# Patient Record
Sex: FEMALE | Race: WHITE | NOT HISPANIC OR LATINO | Employment: FULL TIME | URBAN - METROPOLITAN AREA
[De-identification: names, ages, dates, MRNs, and addresses within clinical notes are randomized per-mention and may not be internally consistent; named-entity substitution may affect disease eponyms.]

---

## 2023-03-15 ENCOUNTER — TELEPHONE (OUTPATIENT)
Dept: PRIMARY CARE | Facility: CLINIC | Age: 58
End: 2023-03-15
Payer: COMMERCIAL

## 2023-08-09 PROBLEM — M79.661 PAIN AND SWELLING OF RIGHT LOWER LEG: Status: ACTIVE | Noted: 2023-08-09

## 2023-08-09 PROBLEM — Z77.21 EXPOSURE TO POTENTIALLY HAZARDOUS BODY FLUIDS: Status: ACTIVE | Noted: 2023-08-09

## 2023-08-09 PROBLEM — M79.662 PAIN AND SWELLING OF LEFT LOWER LEG: Status: ACTIVE | Noted: 2023-08-09

## 2023-08-09 PROBLEM — H01.003 BLEPHARITIS OF EYELID OF RIGHT EYE: Status: ACTIVE | Noted: 2023-08-09

## 2023-08-09 PROBLEM — M25.579 SINUS TARSI SYNDROME: Status: ACTIVE | Noted: 2023-08-09

## 2023-08-09 PROBLEM — E86.0 DEHYDRATION: Status: ACTIVE | Noted: 2023-08-09

## 2023-08-09 PROBLEM — R60.0 EDEMA OF RIGHT FOOT: Status: ACTIVE | Noted: 2023-08-09

## 2023-08-09 PROBLEM — J01.40 ACUTE PANSINUSITIS: Status: ACTIVE | Noted: 2023-08-09

## 2023-08-09 PROBLEM — J06.9 UPPER RESPIRATORY INFECTION, ACUTE: Status: ACTIVE | Noted: 2023-08-09

## 2023-08-09 PROBLEM — N95.0 ABNORMAL VAGINAL BLEEDING IN POSTMENOPAUSAL PATIENT: Status: ACTIVE | Noted: 2023-08-09

## 2023-08-09 PROBLEM — M21.40 PES PLANUS: Status: ACTIVE | Noted: 2023-08-09

## 2023-08-09 PROBLEM — M79.671 RIGHT FOOT PAIN: Status: ACTIVE | Noted: 2023-08-09

## 2023-08-09 PROBLEM — R03.0 BLOOD PRESSURE ELEVATED WITHOUT HISTORY OF HTN: Status: ACTIVE | Noted: 2023-08-09

## 2023-08-09 PROBLEM — N92.6 IRREGULAR MENSTRUAL CYCLE: Status: ACTIVE | Noted: 2023-08-09

## 2023-08-09 PROBLEM — K52.9 ACUTE GASTROENTERITIS: Status: ACTIVE | Noted: 2023-08-09

## 2023-08-09 PROBLEM — E04.2 NONTOXIC MULTINODULAR GOITER: Status: ACTIVE | Noted: 2023-08-09

## 2023-08-09 PROBLEM — M25.562 LEFT KNEE PAIN: Status: ACTIVE | Noted: 2023-08-09

## 2023-08-09 PROBLEM — S99.929A FOOT INJURY: Status: ACTIVE | Noted: 2023-08-09

## 2023-08-09 PROBLEM — M79.89 PAIN AND SWELLING OF RIGHT LOWER LEG: Status: ACTIVE | Noted: 2023-08-09

## 2023-08-09 PROBLEM — M79.89 PAIN AND SWELLING OF LEFT LOWER LEG: Status: ACTIVE | Noted: 2023-08-09

## 2023-08-09 PROBLEM — M76.822 POSTERIOR TIBIAL TENDONITIS, LEFT: Status: ACTIVE | Noted: 2023-08-09

## 2023-08-09 PROBLEM — H01.006 BLEPHARITIS OF EYELID OF LEFT EYE: Status: ACTIVE | Noted: 2023-08-09

## 2023-08-09 PROBLEM — H90.3 BILATERAL HIGH FREQUENCY SENSORINEURAL HEARING LOSS: Status: ACTIVE | Noted: 2023-08-09

## 2023-08-09 PROBLEM — R05.9 COUGH: Status: ACTIVE | Noted: 2023-08-09

## 2023-08-09 PROBLEM — M79.673 PAIN OF FOOT: Status: ACTIVE | Noted: 2023-08-09

## 2023-08-09 PROBLEM — S61.412A LACERATION OF LEFT HAND WITHOUT FOREIGN BODY: Status: ACTIVE | Noted: 2023-08-09

## 2023-08-09 PROBLEM — M17.11 ARTHRITIS OF RIGHT KNEE: Status: ACTIVE | Noted: 2023-08-09

## 2023-08-09 PROBLEM — M19.049 ARTHROPATHY OF HAND: Status: ACTIVE | Noted: 2023-08-09

## 2023-08-09 PROBLEM — R74.8 ELEVATED LIVER ENZYMES: Status: ACTIVE | Noted: 2023-08-09

## 2023-08-09 PROBLEM — E66.9 OBESITY: Status: ACTIVE | Noted: 2023-08-09

## 2023-08-09 PROBLEM — M12.9 ARTHROPATHY OF MULTIPLE SITES: Status: ACTIVE | Noted: 2023-08-09

## 2023-08-09 PROBLEM — K76.0 FATTY INFILTRATION OF LIVER: Status: ACTIVE | Noted: 2023-08-09

## 2023-08-09 PROBLEM — E04.9 THYROID ENLARGEMENT: Status: ACTIVE | Noted: 2023-08-09

## 2023-08-09 PROBLEM — R07.9 CHEST PAIN: Status: ACTIVE | Noted: 2023-08-09

## 2023-08-09 PROBLEM — J01.90 ACUTE SINUSITIS: Status: ACTIVE | Noted: 2023-08-09

## 2023-08-09 PROBLEM — E78.5 HYPERLIPIDEMIA: Status: ACTIVE | Noted: 2023-08-09

## 2023-08-09 PROBLEM — J20.9 ACUTE BRONCHITIS: Status: ACTIVE | Noted: 2023-08-09

## 2023-08-09 PROBLEM — H53.9 CHANGE IN VISION: Status: ACTIVE | Noted: 2023-08-09

## 2023-08-09 PROBLEM — M77.32 CALCANEAL SPUR, LEFT: Status: ACTIVE | Noted: 2023-08-09

## 2023-08-09 PROBLEM — M72.2 PLANTAR FASCIITIS: Status: ACTIVE | Noted: 2023-08-09

## 2023-08-09 PROBLEM — M54.2 NECK PAIN: Status: ACTIVE | Noted: 2023-08-09

## 2023-08-09 PROBLEM — J02.9 SORE THROAT: Status: ACTIVE | Noted: 2023-08-09

## 2023-08-09 PROBLEM — H04.123 DRY EYES, BILATERAL: Status: ACTIVE | Noted: 2023-08-09

## 2023-08-09 PROBLEM — M76.829 TIBIALIS POSTERIOR DYSFUNCTION: Status: ACTIVE | Noted: 2023-08-09

## 2023-08-09 PROBLEM — R06.02 SHORTNESS OF BREATH: Status: ACTIVE | Noted: 2023-08-09

## 2023-08-09 PROBLEM — M25.561 KNEE PAIN, RIGHT: Status: ACTIVE | Noted: 2023-08-09

## 2023-08-09 PROBLEM — H69.90 EUSTACHIAN TUBE DYSFUNCTION: Status: ACTIVE | Noted: 2023-08-09

## 2023-08-09 PROBLEM — I87.2 CHRONIC VENOUS INSUFFICIENCY: Status: ACTIVE | Noted: 2023-08-09
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
## 2023-08-09 PROBLEM — R53.83 FATIGUE: Status: ACTIVE | Noted: 2023-08-09

## 2023-08-09 RX ORDER — VALACYCLOVIR HYDROCHLORIDE 1 G/1
1000 TABLET, FILM COATED ORAL EVERY 8 HOURS
Qty: 21 TABLET | Refills: 0 | COMMUNITY
Start: 2022-11-17 | End: 2022-11-24

## 2023-08-15 ENCOUNTER — APPOINTMENT (OUTPATIENT)
Dept: PRIMARY CARE | Facility: CLINIC | Age: 58
End: 2023-08-15
Payer: COMMERCIAL

## 2023-09-11 DIAGNOSIS — Z12.31 BREAST CANCER SCREENING BY MAMMOGRAM: ICD-10-CM

## 2023-11-22 ENCOUNTER — OFFICE VISIT (OUTPATIENT)
Dept: PRIMARY CARE | Facility: CLINIC | Age: 58
End: 2023-11-22
Payer: COMMERCIAL

## 2023-11-22 ENCOUNTER — LAB (OUTPATIENT)
Dept: LAB | Facility: LAB | Age: 58
End: 2023-11-22
Payer: COMMERCIAL

## 2023-11-22 VITALS
RESPIRATION RATE: 14 BRPM | HEART RATE: 82 BPM | OXYGEN SATURATION: 98 % | BODY MASS INDEX: 35.09 KG/M2 | TEMPERATURE: 97.6 F | SYSTOLIC BLOOD PRESSURE: 137 MMHG | HEIGHT: 65 IN | DIASTOLIC BLOOD PRESSURE: 85 MMHG | WEIGHT: 210.6 LBS

## 2023-11-22 DIAGNOSIS — Z00.00 HEALTHCARE MAINTENANCE: ICD-10-CM

## 2023-11-22 DIAGNOSIS — E78.2 MIXED HYPERLIPIDEMIA: Primary | ICD-10-CM

## 2023-11-22 DIAGNOSIS — Z12.11 COLON CANCER SCREENING: ICD-10-CM

## 2023-11-22 DIAGNOSIS — E66.01 CLASS 2 SEVERE OBESITY DUE TO EXCESS CALORIES WITH SERIOUS COMORBIDITY AND BODY MASS INDEX (BMI) OF 35.0 TO 35.9 IN ADULT (MULTI): ICD-10-CM

## 2023-11-22 DIAGNOSIS — K76.0 FATTY INFILTRATION OF LIVER: ICD-10-CM

## 2023-11-22 LAB
25(OH)D3 SERPL-MCNC: 23 NG/ML (ref 30–100)
ALBUMIN SERPL BCP-MCNC: 4.1 G/DL (ref 3.4–5)
ALP SERPL-CCNC: 76 U/L (ref 33–110)
ALT SERPL W P-5'-P-CCNC: 51 U/L (ref 7–45)
ANION GAP SERPL CALC-SCNC: 12 MMOL/L (ref 10–20)
AST SERPL W P-5'-P-CCNC: 43 U/L (ref 9–39)
BILIRUB SERPL-MCNC: 0.6 MG/DL (ref 0–1.2)
BUN SERPL-MCNC: 11 MG/DL (ref 6–23)
CALCIUM SERPL-MCNC: 9.2 MG/DL (ref 8.6–10.3)
CHLORIDE SERPL-SCNC: 107 MMOL/L (ref 98–107)
CHOLEST SERPL-MCNC: 189 MG/DL (ref 0–199)
CHOLESTEROL/HDL RATIO: 3.9
CO2 SERPL-SCNC: 25 MMOL/L (ref 21–32)
CREAT SERPL-MCNC: 0.71 MG/DL (ref 0.5–1.05)
ERYTHROCYTE [DISTWIDTH] IN BLOOD BY AUTOMATED COUNT: 12.2 % (ref 11.5–14.5)
GFR SERPL CREATININE-BSD FRML MDRD: >90 ML/MIN/1.73M*2
GLUCOSE SERPL-MCNC: 89 MG/DL (ref 74–99)
HCT VFR BLD AUTO: 39.2 % (ref 36–46)
HDLC SERPL-MCNC: 49 MG/DL
HGB BLD-MCNC: 13.4 G/DL (ref 12–16)
LDLC SERPL CALC-MCNC: 113 MG/DL
MCH RBC QN AUTO: 32.3 PG (ref 26–34)
MCHC RBC AUTO-ENTMCNC: 34.2 G/DL (ref 32–36)
MCV RBC AUTO: 95 FL (ref 80–100)
NON HDL CHOLESTEROL: 140 MG/DL (ref 0–149)
NRBC BLD-RTO: 0 /100 WBCS (ref 0–0)
PLATELET # BLD AUTO: 220 X10*3/UL (ref 150–450)
POTASSIUM SERPL-SCNC: 4.1 MMOL/L (ref 3.5–5.3)
PROT SERPL-MCNC: 6.4 G/DL (ref 6.4–8.2)
RBC # BLD AUTO: 4.15 X10*6/UL (ref 4–5.2)
SODIUM SERPL-SCNC: 140 MMOL/L (ref 136–145)
TRIGL SERPL-MCNC: 135 MG/DL (ref 0–149)
TSH SERPL-ACNC: 0.77 MIU/L (ref 0.44–3.98)
VLDL: 27 MG/DL (ref 0–40)
WBC # BLD AUTO: 5.2 X10*3/UL (ref 4.4–11.3)

## 2023-11-22 PROCEDURE — 3008F BODY MASS INDEX DOCD: CPT | Performed by: INTERNAL MEDICINE

## 2023-11-22 PROCEDURE — 82306 VITAMIN D 25 HYDROXY: CPT

## 2023-11-22 PROCEDURE — 93000 ELECTROCARDIOGRAM COMPLETE: CPT | Performed by: INTERNAL MEDICINE

## 2023-11-22 PROCEDURE — 82274 ASSAY TEST FOR BLOOD FECAL: CPT | Performed by: INTERNAL MEDICINE

## 2023-11-22 PROCEDURE — 85027 COMPLETE CBC AUTOMATED: CPT

## 2023-11-22 PROCEDURE — 36415 COLL VENOUS BLD VENIPUNCTURE: CPT

## 2023-11-22 PROCEDURE — 84443 ASSAY THYROID STIM HORMONE: CPT

## 2023-11-22 PROCEDURE — 80061 LIPID PANEL: CPT

## 2023-11-22 PROCEDURE — 80053 COMPREHEN METABOLIC PANEL: CPT

## 2023-11-22 PROCEDURE — 1036F TOBACCO NON-USER: CPT | Performed by: INTERNAL MEDICINE

## 2023-11-22 PROCEDURE — 99396 PREV VISIT EST AGE 40-64: CPT | Performed by: INTERNAL MEDICINE

## 2023-11-22 RX ORDER — IBUPROFEN 200 MG
200 TABLET ORAL EVERY 6 HOURS
COMMUNITY

## 2023-11-22 ASSESSMENT — ENCOUNTER SYMPTOMS
RESPIRATORY NEGATIVE: 1
SPEECH DIFFICULTY: 0
ARTHRALGIAS: 0
NEUROLOGICAL NEGATIVE: 1
CONFUSION: 0
LIGHT-HEADEDNESS: 0
COLOR CHANGE: 0
UNEXPECTED WEIGHT CHANGE: 0
SINUS PRESSURE: 0
POLYPHAGIA: 0
APPETITE CHANGE: 0
NAUSEA: 0
TREMORS: 0
MYALGIAS: 0
HEADACHES: 0
WEAKNESS: 0
SORE THROAT: 0
BACK PAIN: 0
DIARRHEA: 0
DIFFICULTY URINATING: 0
BLOOD IN STOOL: 0
NECK STIFFNESS: 0
NUMBNESS: 0
WHEEZING: 0
BRUISES/BLEEDS EASILY: 0
EYE PAIN: 0
TROUBLE SWALLOWING: 0
DIZZINESS: 0
DEPRESSION: 0
POLYDIPSIA: 0
ACTIVITY CHANGE: 0
AGITATION: 0
ENDOCRINE NEGATIVE: 1
ABDOMINAL PAIN: 0
HEMATOLOGIC/LYMPHATIC NEGATIVE: 1
SHORTNESS OF BREATH: 0
ALLERGIC/IMMUNOLOGIC NEGATIVE: 1
CARDIOVASCULAR NEGATIVE: 1
STRIDOR: 0
COUGH: 0
DYSURIA: 0
NERVOUS/ANXIOUS: 0
HALLUCINATIONS: 0
EYES NEGATIVE: 1
EYE DISCHARGE: 0
ADENOPATHY: 0
SLEEP DISTURBANCE: 0
WOUND: 0
EYE REDNESS: 0
SEIZURES: 0
CONSTIPATION: 0
PSYCHIATRIC NEGATIVE: 1
VOMITING: 0
FLANK PAIN: 0
LOSS OF SENSATION IN FEET: 0
FREQUENCY: 0
CONSTITUTIONAL NEGATIVE: 1
MUSCULOSKELETAL NEGATIVE: 1
FACIAL ASYMMETRY: 0
CHEST TIGHTNESS: 0
NECK PAIN: 0
JOINT SWELLING: 0
SINUS PAIN: 0
GASTROINTESTINAL NEGATIVE: 1
VOICE CHANGE: 0
OCCASIONAL FEELINGS OF UNSTEADINESS: 0
PALPITATIONS: 0

## 2023-11-22 ASSESSMENT — PATIENT HEALTH QUESTIONNAIRE - PHQ9
1. LITTLE INTEREST OR PLEASURE IN DOING THINGS: NOT AT ALL
SUM OF ALL RESPONSES TO PHQ9 QUESTIONS 1 AND 2: 0
2. FEELING DOWN, DEPRESSED OR HOPELESS: NOT AT ALL

## 2023-11-22 NOTE — PROGRESS NOTES
Subjective   Patient ID: Maureen Landis is a 58 y.o. female who presents for Annual Exam (Patient is here for a physical.).  HPI    Patient has been feeling pretty good and has been complaint with prescribed medications.  Under a lot of stress due to her 77 yo mother declining health status.  Her father who is 79 yo has been still working.    She works at [employer].    We reviewed and discussed details of  blood work: CBC, CMP, TSH, Lipid panel, Hb A1c, Vit D done in 2022.  Results within acceptable rang except mildly elevated liver enzymes and LDL cholesterol.    Overdue for mammogram and colon ca screening, strongly advised to arrange both.  PAP: per GYN    Review of Systems   Constitutional: Negative.  Negative for activity change, appetite change and unexpected weight change.   HENT: Negative.  Negative for congestion, ear discharge, ear pain, hearing loss, mouth sores, nosebleeds, sinus pressure, sinus pain, sore throat, trouble swallowing and voice change.    Eyes: Negative.  Negative for pain, discharge, redness and visual disturbance.   Respiratory: Negative.  Negative for cough, chest tightness, shortness of breath, wheezing and stridor.    Cardiovascular: Negative.  Negative for chest pain, palpitations and leg swelling.   Gastrointestinal: Negative.  Negative for abdominal pain, blood in stool, constipation, diarrhea, nausea and vomiting.   Endocrine: Negative.  Negative for polydipsia, polyphagia and polyuria.   Genitourinary: Negative.  Negative for difficulty urinating, dysuria, flank pain, frequency and urgency.   Musculoskeletal: Negative.  Negative for arthralgias, back pain, gait problem, joint swelling, myalgias, neck pain and neck stiffness.   Skin: Negative.  Negative for color change, rash and wound.   Allergic/Immunologic: Negative.  Negative for environmental allergies, food allergies and immunocompromised state.   Neurological: Negative.  Negative for  "dizziness, tremors, seizures, syncope, facial asymmetry, speech difficulty, weakness, light-headedness, numbness and headaches.   Hematological: Negative.  Negative for adenopathy. Does not bruise/bleed easily.   Psychiatric/Behavioral: Negative.  Negative for agitation, behavioral problems, confusion, hallucinations, sleep disturbance and suicidal ideas. The patient is not nervous/anxious.    All other systems reviewed and are negative.      Objective     Review of systems was performed and all systems were negative except what in HPI    /85   Pulse 82   Temp 36.4 °C (97.6 °F)   Resp 14   Ht 1.651 m (5' 5\")   Wt 95.5 kg (210 lb 9.6 oz)   SpO2 98%   BMI 35.05 kg/m²    Physical Exam  Vitals and nursing note reviewed.   Constitutional:       General: She is not in acute distress.     Appearance: Normal appearance.   HENT:      Head: Normocephalic and atraumatic.      Right Ear: Tympanic membrane, ear canal and external ear normal.      Left Ear: Tympanic membrane, ear canal and external ear normal.      Nose: Nose normal. No congestion or rhinorrhea.      Mouth/Throat:      Mouth: Mucous membranes are moist.      Pharynx: Oropharynx is clear.   Eyes:      General:         Right eye: No discharge.         Left eye: No discharge.      Extraocular Movements: Extraocular movements intact.      Conjunctiva/sclera: Conjunctivae normal.      Pupils: Pupils are equal, round, and reactive to light.   Cardiovascular:      Rate and Rhythm: Normal rate and regular rhythm.      Pulses: Normal pulses.      Heart sounds: Normal heart sounds. No murmur heard.     No friction rub. No gallop.   Pulmonary:      Effort: Pulmonary effort is normal. No respiratory distress.      Breath sounds: Normal breath sounds. No stridor. No wheezing, rhonchi or rales.   Chest:      Chest wall: No tenderness.   Abdominal:      General: Bowel sounds are normal.      Palpations: Abdomen is soft. There is no mass.      Tenderness: There is " no abdominal tenderness. There is no guarding or rebound.   Musculoskeletal:         General: No swelling or deformity. Normal range of motion.      Cervical back: Normal range of motion and neck supple. No rigidity or tenderness.      Right lower leg: No edema.      Left lower leg: No edema.   Lymphadenopathy:      Cervical: No cervical adenopathy.   Skin:     General: Skin is warm and dry.      Coloration: Skin is not jaundiced.      Findings: No bruising or erythema.   Neurological:      General: No focal deficit present.      Mental Status: She is alert and oriented to person, place, and time. Mental status is at baseline.      Cranial Nerves: No cranial nerve deficit.      Motor: No weakness.      Coordination: Coordination normal.      Gait: Gait normal.   Psychiatric:         Mood and Affect: Mood normal.         Behavior: Behavior normal.         Thought Content: Thought content normal.         Judgment: Judgment normal.         Assessment/Plan   Problem List Items Addressed This Visit             ICD-10-CM       Cardiac and Vasculature    Hyperlipidemia - Primary E78.5     Low cholesterol and low carbohydrate diet is advised.          Relevant Orders    ECG 12 lead (Clinic Performed) (Completed)       Endocrine/Metabolic    Obesity E66.9     Weight loss is advised. Target BMI: 25. Please follow low carbohydrate diet and exercise at least 150 minutes weekly.  Nutritional consultation is available, please let me know if interested.             Gastrointestinal and Abdominal    Fatty infiltration of liver K76.0     Weight loss is advised. Target BMI: 25. Please follow low carbohydrate diet and exercise at least 150 minutes weekly.  Nutritional consultation is available, please let me know if interested.             Health Encounters    Healthcare maintenance Z00.00    Relevant Orders    CBC    Comprehensive Metabolic Panel    Lipid Panel    TSH with reflex to Free T4 if abnormal    Vitamin D 25-Hydroxy,Total  (for eval of Vitamin D levels)     Other Visit Diagnoses         Codes    Colon cancer screening     Z12.11    Relevant Orders    Fecal Occult Blood Immunoassy        It was a pleasure to see you today.  I would like to remind you about importance of a healthy lifestyle in order to improve your well-being and live longer.  Try to engage in physical activities for at least 150 minutes per week.  Eat about 10 servings of fruits and vegetables daily. My advice is 2 servings of fruits and 8 servings of vegetables.  For vegetables choose at least half of them green and at least half of them fresh.  Please avoid sugar, salt, fried food and saturated fat.    F/up in 1 year or sooner if needed.

## 2023-11-25 LAB — HEMOCCULT STL QL IA: NEGATIVE

## 2024-04-03 ENCOUNTER — APPOINTMENT (OUTPATIENT)
Dept: OTOLARYNGOLOGY | Facility: CLINIC | Age: 59
End: 2024-04-03
Payer: COMMERCIAL

## 2024-04-03 ENCOUNTER — APPOINTMENT (OUTPATIENT)
Dept: AUDIOLOGY | Facility: CLINIC | Age: 59
End: 2024-04-03
Payer: COMMERCIAL

## 2024-04-16 ENCOUNTER — HOSPITAL ENCOUNTER (EMERGENCY)
Facility: HOSPITAL | Age: 59
Discharge: HOME | End: 2024-04-16
Payer: COMMERCIAL

## 2024-04-16 ENCOUNTER — APPOINTMENT (OUTPATIENT)
Dept: RADIOLOGY | Facility: HOSPITAL | Age: 59
End: 2024-04-16
Payer: COMMERCIAL

## 2024-04-16 VITALS
RESPIRATION RATE: 20 BRPM | HEART RATE: 96 BPM | WEIGHT: 210 LBS | HEIGHT: 66 IN | DIASTOLIC BLOOD PRESSURE: 94 MMHG | SYSTOLIC BLOOD PRESSURE: 160 MMHG | TEMPERATURE: 97 F | OXYGEN SATURATION: 99 % | BODY MASS INDEX: 33.75 KG/M2

## 2024-04-16 DIAGNOSIS — S80.01XA CONTUSION OF RIGHT KNEE, INITIAL ENCOUNTER: ICD-10-CM

## 2024-04-16 DIAGNOSIS — S50.01XA CONTUSION OF RIGHT ELBOW, INITIAL ENCOUNTER: Primary | ICD-10-CM

## 2024-04-16 PROCEDURE — 73560 X-RAY EXAM OF KNEE 1 OR 2: CPT | Mod: RIGHT SIDE | Performed by: RADIOLOGY

## 2024-04-16 PROCEDURE — 73080 X-RAY EXAM OF ELBOW: CPT | Mod: RIGHT SIDE | Performed by: RADIOLOGY

## 2024-04-16 PROCEDURE — 99284 EMERGENCY DEPT VISIT MOD MDM: CPT

## 2024-04-16 PROCEDURE — 73560 X-RAY EXAM OF KNEE 1 OR 2: CPT | Mod: RT

## 2024-04-16 PROCEDURE — 2500000004 HC RX 250 GENERAL PHARMACY W/ HCPCS (ALT 636 FOR OP/ED): Performed by: NURSE PRACTITIONER

## 2024-04-16 PROCEDURE — 96372 THER/PROPH/DIAG INJ SC/IM: CPT | Performed by: NURSE PRACTITIONER

## 2024-04-16 PROCEDURE — 73080 X-RAY EXAM OF ELBOW: CPT | Mod: RT

## 2024-04-16 RX ORDER — KETOROLAC TROMETHAMINE 30 MG/ML
30 INJECTION, SOLUTION INTRAMUSCULAR; INTRAVENOUS ONCE
Status: COMPLETED | OUTPATIENT
Start: 2024-04-16 | End: 2024-04-16

## 2024-04-16 RX ADMIN — KETOROLAC TROMETHAMINE 30 MG: 30 INJECTION, SOLUTION INTRAMUSCULAR at 12:55

## 2024-04-16 ASSESSMENT — COLUMBIA-SUICIDE SEVERITY RATING SCALE - C-SSRS
2. HAVE YOU ACTUALLY HAD ANY THOUGHTS OF KILLING YOURSELF?: NO
1. IN THE PAST MONTH, HAVE YOU WISHED YOU WERE DEAD OR WISHED YOU COULD GO TO SLEEP AND NOT WAKE UP?: NO
6. HAVE YOU EVER DONE ANYTHING, STARTED TO DO ANYTHING, OR PREPARED TO DO ANYTHING TO END YOUR LIFE?: NO

## 2024-04-16 ASSESSMENT — PAIN SCALES - GENERAL: PAINLEVEL_OUTOF10: 8

## 2024-04-16 ASSESSMENT — VISUAL ACUITY: OU: 1

## 2024-04-16 ASSESSMENT — PAIN - FUNCTIONAL ASSESSMENT: PAIN_FUNCTIONAL_ASSESSMENT: 0-10

## 2024-04-16 NOTE — Clinical Note
Maureen Landis was seen and treated in our emergency department on 4/16/2024.  She may return to work on 04/18/2024.       If you have any questions or concerns, please don't hesitate to call.      Aelx Monk, LISBET-CNP

## 2024-04-16 NOTE — ED PROVIDER NOTES
HPI   Chief Complaint   Patient presents with    Leg Injury    Arm Injury       Patient is a 59-year-old female with past medical history of hypertension, who presents ED today due to right knee and elbow pain.  Patient states she was on her deck yesterday and more of the boards broke and her right leg went through.  Patient states initially she thought she was just bruised up from the boards but this morning she woke up and was having moderate pain in her right knee and right elbow.  She denies any distal numbness or weakness.  She has full range of motion with both knee and elbow.      History provided by:  Patient   used: No                        [place] Coma Scale Score: 15                     Patient History   Past Medical History:   Diagnosis Date    Elevated blood-pressure reading, without diagnosis of hypertension 2018    Blood pressure elevated without history of HTN    Other conditions influencing health status 10/12/2017    History of cough    Pain in right foot 2016    Right foot pain    Personal history of (healed) traumatic fracture     History of fracture of patella    Personal history of other diseases of the nervous system and sense organs 2018    History of hearing loss    Personal history of other diseases of the respiratory system 2020    History of acute bronchitis    Personal history of other specified conditions 2017    History of heartburn     Past Surgical History:   Procedure Laterality Date    APPENDECTOMY  2017    Appendectomy     SECTION, CLASSIC  2014     Section    CHOLECYSTECTOMY  2014    Cholecystectomy Laparoscopic    CHOLECYSTECTOMY  2017    Cholecystectomy    KNEE SURGERY  2017    Knee Surgery     Family History   Problem Relation Name Age of Onset    Arthritis Mother      Thyroid disease Maternal Grandmother      Glaucoma Maternal Grandfather      Cancer Other       Social History      Tobacco Use    Smoking status: Never     Passive exposure: Never    Smokeless tobacco: Never   Vaping Use    Vaping status: Never Used   Substance Use Topics    Alcohol use: Not on file    Drug use: Not on file       Physical Exam   ED Triage Vitals [04/16/24 1244]   Temperature Heart Rate Respirations BP   36.1 °C (97 °F) 96 20 (!) 160/94      Pulse Ox Temp Source Heart Rate Source Patient Position   99 % Temporal Monitor Sitting      BP Location FiO2 (%)     Right arm --       Physical Exam  Vitals and nursing note reviewed.   Constitutional:       General: She is not in acute distress.     Appearance: Normal appearance. She is not toxic-appearing or diaphoretic.      Interventions: Cervical collar in place.   HENT:      Head: Normocephalic and atraumatic. No raccoon eyes, Landa's sign, abrasion, contusion, right periorbital erythema, left periorbital erythema or laceration.      Jaw: No trismus, tenderness, swelling or malocclusion.      Right Ear: Tympanic membrane normal. No hemotympanum.      Left Ear: Tympanic membrane normal. No hemotympanum.      Nose: No nasal tenderness.      Right Nostril: No epistaxis or septal hematoma.      Left Nostril: No epistaxis or septal hematoma.      Mouth/Throat:      Mouth: Mucous membranes are moist. No injury.   Eyes:      General: Lids are normal. Vision grossly intact.      Extraocular Movements: Extraocular movements intact.      Right eye: Normal extraocular motion.      Left eye: Normal extraocular motion.      Conjunctiva/sclera:      Right eye: No hemorrhage.     Left eye: No hemorrhage.     Pupils: Pupils are equal, round, and reactive to light.   Cardiovascular:      Rate and Rhythm: Normal rate and regular rhythm.      Pulses:           Radial pulses are 2+ on the right side and 2+ on the left side.        Femoral pulses are 2+ on the right side and 2+ on the left side.       Dorsalis pedis pulses are 2+ on the right side and 2+ on the left side.         Posterior tibial pulses are 2+ on the right side and 2+ on the left side.   Pulmonary:      Effort: No respiratory distress.      Breath sounds: Normal breath sounds.   Abdominal:      General: Abdomen is flat.      Tenderness: There is no abdominal tenderness. There is no guarding or rebound.   Musculoskeletal:      Right shoulder: No swelling, deformity or tenderness.      Left shoulder: No swelling, deformity or tenderness.      Right upper arm: No swelling, deformity or tenderness.      Left upper arm: No swelling, deformity or tenderness.      Right elbow: No swelling or deformity. Tenderness present in medial epicondyle and lateral epicondyle.      Left elbow: No swelling or deformity. No tenderness.      Right forearm: No swelling, deformity or tenderness.      Left forearm: No swelling, deformity or tenderness.      Right wrist: No swelling, deformity or tenderness.      Left wrist: No swelling, deformity or tenderness.      Right hand: No swelling, deformity or tenderness.      Left hand: No swelling, deformity or tenderness.      Cervical back: No deformity, tenderness or bony tenderness. No spinous process tenderness.      Thoracic back: No deformity or bony tenderness.      Lumbar back: No deformity or bony tenderness.      Right hip: No deformity or tenderness.      Left hip: No deformity or tenderness.      Right upper leg: No deformity or tenderness.      Left upper leg: No deformity or tenderness.      Right knee: No deformity, bony tenderness or crepitus. Tenderness present over the patellar tendon.      Left knee: No deformity. No tenderness.      Right lower leg: No deformity or tenderness.      Left lower leg: No deformity or tenderness.      Right ankle: No deformity. No tenderness.      Left ankle: No deformity. No tenderness.      Right foot: No deformity or tenderness. Normal pulse.      Left foot: No deformity or tenderness. Normal pulse.   Skin:     General: Skin is warm.      Findings: No  abrasion or laceration.   Neurological:      Mental Status: She is alert.      GCS: GCS eye subscore is 4. GCS verbal subscore is 5. GCS motor subscore is 6.      Sensory: Sensation is intact.         ED Course & MDM   ED Course as of 04/16/24 1414   Tue Apr 16, 2024   1413 XR knee right 1-2 views  Degenerative changes of the knee. No acute osseous findings. [WS]   1413 XR elbow right 3+ views  Small joint effusion noted.  Small joint effusion noted. [WS]      ED Course User Index  [WS] Alex Monk, APRN-CNP         Diagnoses as of 04/16/24 1414   Contusion of right elbow, initial encounter   Contusion of right knee, initial encounter       Medical Decision Making  Differential diagnosis: Elbow contusion, elbow sprain, elbow fracture, knee fracture, knee sprain, knee contusion.  Patient's vital signs are stable.  Patient is taking nothing for pain relief at home.  She was given IM Toradol in the ED with moderate relief of her pain.  X-ray imaging of her elbow and knee are both negative for acute findings, knee x-ray does show degenerative changes.  However x-ray does show a small joint effusion, given that patient has full range of motion without pain I do not believe that this is consistent with an occult fracture.  Patient will be given referral to orthopedic if pain persists or worsens.  Patient's symptoms are most consistent with contusions given the traumatic nature of her injuries.  I did advise her to follow-up with her PCP and gave her prescription for anti-inflammatories for home.  RICE therapy recommended and patient was educated on this modality.    I discussed the differential, results and discharge plan with the patient.  I emphasized the importance of follow-up with the physician I referred them to in the timeframe recommended.  I explained reasons for the them to return to the Emergency Department. Additional verbal discharge instructions were also given and discussed with them to supplement those  generated by the EMR. We also discussed medications that were prescribed (if any) including common side effects and interactions. All questions were addressed.  They understand return precautions and discharge instructions. They expressed understanding.            Amount and/or Complexity of Data Reviewed  Radiology: ordered and independent interpretation performed. Decision-making details documented in ED Course.    Risk  OTC drugs.  Prescription drug management.        Procedure  Procedures     LISBET Sinha-CNP  04/16/24 8944

## 2024-04-16 NOTE — ED TRIAGE NOTES
Pt to ER c/o right leg and right arm injury, that happened yesterday. Pt states she fell though her wooden porch. Last tetanus shot in 2019, per patient.

## 2024-06-13 ENCOUNTER — OFFICE VISIT (OUTPATIENT)
Dept: OTOLARYNGOLOGY | Facility: CLINIC | Age: 59
End: 2024-06-13
Payer: COMMERCIAL

## 2024-06-13 ENCOUNTER — CLINICAL SUPPORT (OUTPATIENT)
Dept: AUDIOLOGY | Facility: CLINIC | Age: 59
End: 2024-06-13
Payer: COMMERCIAL

## 2024-06-13 VITALS
HEIGHT: 66 IN | BODY MASS INDEX: 36 KG/M2 | HEART RATE: 68 BPM | WEIGHT: 224 LBS | DIASTOLIC BLOOD PRESSURE: 81 MMHG | TEMPERATURE: 98 F | SYSTOLIC BLOOD PRESSURE: 159 MMHG

## 2024-06-13 DIAGNOSIS — H90.A32 MIXED CONDUCTIVE AND SENSORINEURAL HEARING LOSS OF LEFT EAR WITH RESTRICTED HEARING OF RIGHT EAR: ICD-10-CM

## 2024-06-13 DIAGNOSIS — H90.A21 SENSORINEURAL HEARING LOSS (SNHL) OF RIGHT EAR WITH RESTRICTED HEARING OF LEFT EAR: Primary | ICD-10-CM

## 2024-06-13 PROCEDURE — 92557 COMPREHENSIVE HEARING TEST: CPT | Performed by: AUDIOLOGIST

## 2024-06-13 PROCEDURE — 99203 OFFICE O/P NEW LOW 30 MIN: CPT | Performed by: NURSE PRACTITIONER

## 2024-06-13 PROCEDURE — 92550 TYMPANOMETRY & REFLEX THRESH: CPT | Performed by: AUDIOLOGIST

## 2024-06-13 PROCEDURE — 99213 OFFICE O/P EST LOW 20 MIN: CPT | Performed by: NURSE PRACTITIONER

## 2024-06-13 PROCEDURE — 1036F TOBACCO NON-USER: CPT | Performed by: NURSE PRACTITIONER

## 2024-06-13 PROCEDURE — 3008F BODY MASS INDEX DOCD: CPT | Performed by: NURSE PRACTITIONER

## 2024-06-13 SDOH — ECONOMIC STABILITY: FOOD INSECURITY: WITHIN THE PAST 12 MONTHS, THE FOOD YOU BOUGHT JUST DIDN'T LAST AND YOU DIDN'T HAVE MONEY TO GET MORE.: NEVER TRUE

## 2024-06-13 SDOH — ECONOMIC STABILITY: FOOD INSECURITY: WITHIN THE PAST 12 MONTHS, YOU WORRIED THAT YOUR FOOD WOULD RUN OUT BEFORE YOU GOT MONEY TO BUY MORE.: NEVER TRUE

## 2024-06-13 ASSESSMENT — ENCOUNTER SYMPTOMS
OCCASIONAL FEELINGS OF UNSTEADINESS: 0
DEPRESSION: 0
LOSS OF SENSATION IN FEET: 0

## 2024-06-13 ASSESSMENT — LIFESTYLE VARIABLES
SKIP TO QUESTIONS 9-10: 1
HOW MANY STANDARD DRINKS CONTAINING ALCOHOL DO YOU HAVE ON A TYPICAL DAY: PATIENT DOES NOT DRINK
HOW OFTEN DO YOU HAVE SIX OR MORE DRINKS ON ONE OCCASION: NEVER
AUDIT-C TOTAL SCORE: 0
HOW OFTEN DO YOU HAVE A DRINK CONTAINING ALCOHOL: NEVER

## 2024-06-13 ASSESSMENT — PATIENT HEALTH QUESTIONNAIRE - PHQ9
SUM OF ALL RESPONSES TO PHQ9 QUESTIONS 1 AND 2: 0
1. LITTLE INTEREST OR PLEASURE IN DOING THINGS: NOT AT ALL
2. FEELING DOWN, DEPRESSED OR HOPELESS: NOT AT ALL

## 2024-06-13 ASSESSMENT — PAIN SCALES - GENERAL: PAINLEVEL: 0-NO PAIN

## 2024-06-13 NOTE — PROGRESS NOTES
"AUDIOLOGY ADULT AUDIOMETRIC EVALUATION      Name:  Maureen Landis  :  1965  Age:  59 y.o.  Date of Evaluation:  2024    HISTORY  Reason for visit:  hearing loss  Ms. Landis is seen 24 at the request of Denise López CNP for an evaluation of hearing.      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    RESULTS   Otoscopic Evaluation: clear canals bilaterally      Immittance Measures (226 Hz probe tone):   Tympanometry is consistent with normal middle ear pressure and normal tympanic membrane mobility bilaterally.       Ipsilateral acoustic reflexes (500-4000 Hz) are  present for the right ear for 500-4000 Hz and present for the left ear for 500-2000 Hz (absent 4000 Hz).      Test technique:  standard behavioral technique via insert and TDH earphones.  Reliability is good.    Pure Tone Audiometry:  Hearing sensitivity is in the normal hearing to moderate hearing loss range bilaterally.  Note left air-bone gap for 6000 Hz.      Speech Audiometry:        Right Ear:  Speech Reception Threshold (SRT) was obtained at 15 dBHL                 Speech discrimination score was 100% in quiet when words were presented at 65 dBHL (10 item NU-6 ordered-by-difficulty list).        Left Ear:  Speech Reception Threshold (SRT) was obtained at 20 dBHL                 Speech discrimination score was 100% in quiet when words were presented at 70 dBHL (10 item NU-6 ordered-by-difficulty list).      IMPRESSIONS:  In comparison with " previous audiogram of 1/8/2019, hearing is stable bilaterally.      Patient is expected to have communication difficulty in adverse listening environments.    Patient is expected to benefit from effective communication strategies.        RECOMMENDATIONS  Continue with medical follow-up with Denise López CNP.  Reassess hearing in 1 year (or sooner if medically indicated or if there is a concern for a change in hearing).    Continue with medical follow-up as indicated.      PATIENT EDUCATION  Discussed results and recommendations with patient.  Questions were addressed and the patient was encouraged to contact our department should concerns arise.       JACQUIE Hill, CCC-A  Licensed Audiologist
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
## 2024-10-31 ENCOUNTER — APPOINTMENT (OUTPATIENT)
Dept: PRIMARY CARE | Facility: CLINIC | Age: 59
End: 2024-10-31
Payer: COMMERCIAL

## 2024-11-22 ENCOUNTER — APPOINTMENT (OUTPATIENT)
Dept: PRIMARY CARE | Facility: CLINIC | Age: 59
End: 2024-11-22
Payer: COMMERCIAL

## 2024-11-22 VITALS
OXYGEN SATURATION: 99 % | RESPIRATION RATE: 16 BRPM | SYSTOLIC BLOOD PRESSURE: 130 MMHG | TEMPERATURE: 98 F | BODY MASS INDEX: 35.32 KG/M2 | WEIGHT: 219.8 LBS | HEIGHT: 66 IN | DIASTOLIC BLOOD PRESSURE: 80 MMHG | HEART RATE: 82 BPM

## 2024-11-22 DIAGNOSIS — Z12.11 COLON CANCER SCREENING: ICD-10-CM

## 2024-11-22 DIAGNOSIS — M25.561 RIGHT KNEE PAIN, UNSPECIFIED CHRONICITY: ICD-10-CM

## 2024-11-22 DIAGNOSIS — M79.672 LEFT FOOT PAIN: ICD-10-CM

## 2024-11-22 DIAGNOSIS — J06.9 UPPER RESPIRATORY TRACT INFECTION, UNSPECIFIED TYPE: ICD-10-CM

## 2024-11-22 DIAGNOSIS — K76.0 FATTY INFILTRATION OF LIVER: ICD-10-CM

## 2024-11-22 DIAGNOSIS — E78.2 MIXED HYPERLIPIDEMIA: ICD-10-CM

## 2024-11-22 DIAGNOSIS — Z00.00 HEALTHCARE MAINTENANCE: Primary | ICD-10-CM

## 2024-11-22 PROCEDURE — 99396 PREV VISIT EST AGE 40-64: CPT | Performed by: INTERNAL MEDICINE

## 2024-11-22 PROCEDURE — 3008F BODY MASS INDEX DOCD: CPT | Performed by: INTERNAL MEDICINE

## 2024-11-22 RX ORDER — AMOXICILLIN 875 MG/1
875 TABLET, FILM COATED ORAL 2 TIMES DAILY
Qty: 20 TABLET | Refills: 0 | Status: SHIPPED | OUTPATIENT
Start: 2024-11-22 | End: 2024-12-02

## 2024-11-22 ASSESSMENT — ENCOUNTER SYMPTOMS
EYE REDNESS: 0
SEIZURES: 0
NECK STIFFNESS: 0
EYE PAIN: 0
SINUS PRESSURE: 0
EYE DISCHARGE: 0
UNEXPECTED WEIGHT CHANGE: 0
CARDIOVASCULAR NEGATIVE: 1
VOICE CHANGE: 0
COLOR CHANGE: 0
ACTIVITY CHANGE: 0
NAUSEA: 0
PSYCHIATRIC NEGATIVE: 1
SINUS PAIN: 0
DIZZINESS: 0
DIARRHEA: 0
EYES NEGATIVE: 1
WEAKNESS: 0
CONSTITUTIONAL NEGATIVE: 1
STRIDOR: 0
WOUND: 0
POLYPHAGIA: 0
JOINT SWELLING: 0
PALPITATIONS: 0
FLANK PAIN: 0
ENDOCRINE NEGATIVE: 1
CHEST TIGHTNESS: 0
FREQUENCY: 0
POLYDIPSIA: 0
WHEEZING: 0
HEADACHES: 0
GASTROINTESTINAL NEGATIVE: 1
NERVOUS/ANXIOUS: 0
SPEECH DIFFICULTY: 0
CONSTIPATION: 0
DIFFICULTY URINATING: 0
MYALGIAS: 0
SHORTNESS OF BREATH: 0
BLOOD IN STOOL: 0
TROUBLE SWALLOWING: 0
CONFUSION: 0
HEMATOLOGIC/LYMPHATIC NEGATIVE: 1
NUMBNESS: 0
SLEEP DISTURBANCE: 0
SORE THROAT: 0
DYSURIA: 0
AGITATION: 0
BACK PAIN: 0
BRUISES/BLEEDS EASILY: 0
HALLUCINATIONS: 0
ADENOPATHY: 0
LIGHT-HEADEDNESS: 0
VOMITING: 0
ABDOMINAL PAIN: 0
NECK PAIN: 0
TREMORS: 0
FACIAL ASYMMETRY: 0
ALLERGIC/IMMUNOLOGIC NEGATIVE: 1
APPETITE CHANGE: 0

## 2024-11-22 NOTE — PROGRESS NOTES
Subjective   Patient ID: Maureen Landis is a 59 y.o. female who presents for Annual Exam (Pt is here due to annual physical).  HPI    Patient has been feeling pretty good and has been complaint with prescribed medications.    We reviewed and discussed details of recent blood work: CBC, CMP, TSH, Lipid panel, Hb A1c, Vit D done in 2023. Results within acceptable range.  Mildly elevated LFT's and LDLC noted, low vit d level.    Patient has been c/o sinus and chest congestion,  For about a month. Was trying OTC medications without much improvement.  Fever and chills resolved.       C/o right knee pain  Left foot pain  Follows with PT at [facility]    Last mammogram: ordered  PAP: per GYN Dr. De León  Colonoscopy: ordered    Mother passed in November 2023      Review of Systems   Constitutional: Negative.  Negative for activity change, appetite change and unexpected weight change.   HENT:  Positive for congestion. Negative for ear discharge, ear pain, hearing loss, mouth sores, nosebleeds, sinus pressure, sinus pain, sore throat, trouble swallowing and voice change.    Eyes: Negative.  Negative for pain, discharge, redness and visual disturbance.   Respiratory:  Positive for cough. Negative for chest tightness, shortness of breath, wheezing and stridor.    Cardiovascular: Negative.  Negative for chest pain, palpitations and leg swelling.   Gastrointestinal: Negative.  Negative for abdominal pain, blood in stool, constipation, diarrhea, nausea and vomiting.   Endocrine: Negative.  Negative for polydipsia, polyphagia and polyuria.   Genitourinary: Negative.  Negative for difficulty urinating, dysuria, flank pain, frequency and urgency.   Musculoskeletal:  Positive for arthralgias and gait problem. Negative for back pain, joint swelling, myalgias, neck pain and neck stiffness.   Skin: Negative.  Negative for color change, rash and wound.   Allergic/Immunologic: Negative.  Negative for environmental allergies, food allergies and  "immunocompromised state.   Neurological:  Negative for dizziness, tremors, seizures, syncope, facial asymmetry, speech difficulty, weakness, light-headedness, numbness and headaches.   Hematological: Negative.  Negative for adenopathy. Does not bruise/bleed easily.   Psychiatric/Behavioral: Negative.  Negative for agitation, behavioral problems, confusion, hallucinations, sleep disturbance and suicidal ideas. The patient is not nervous/anxious.    All other systems reviewed and are negative.      Objective     Review of systems was performed and all systems were negative except what in HPI    /80 (BP Location: Left arm, Patient Position: Sitting, BP Cuff Size: Large adult)   Pulse 82   Temp 36.7 °C (98 °F) (Temporal)   Resp 16   Ht 1.676 m (5' 6\")   Wt 99.7 kg (219 lb 12.8 oz)   SpO2 99%   BMI 35.48 kg/m²    Physical Exam  Vitals and nursing note reviewed.   Constitutional:       General: She is not in acute distress.     Appearance: Normal appearance.   HENT:      Head: Normocephalic and atraumatic.      Right Ear: Tympanic membrane, ear canal and external ear normal.      Left Ear: Tympanic membrane, ear canal and external ear normal.      Nose: Nose normal. No congestion or rhinorrhea.      Mouth/Throat:      Mouth: Mucous membranes are moist.      Pharynx: Oropharynx is clear. Postnasal drip present.   Eyes:      General:         Right eye: No discharge.         Left eye: No discharge.      Extraocular Movements: Extraocular movements intact.      Conjunctiva/sclera: Conjunctivae normal.      Pupils: Pupils are equal, round, and reactive to light.   Cardiovascular:      Rate and Rhythm: Normal rate and regular rhythm.      Pulses: Normal pulses.      Heart sounds: Normal heart sounds. No murmur heard.     No friction rub. No gallop.   Pulmonary:      Effort: Pulmonary effort is normal. No respiratory distress.      Breath sounds: Normal breath sounds. No stridor. No wheezing, rhonchi or rales. "   Chest:      Chest wall: No tenderness.   Abdominal:      General: Bowel sounds are normal.      Palpations: Abdomen is soft. There is no mass.      Tenderness: There is no abdominal tenderness. There is no guarding or rebound.   Musculoskeletal:         General: No swelling or deformity. Normal range of motion.      Cervical back: Normal range of motion and neck supple. No rigidity or tenderness.      Right lower leg: No edema.      Left lower leg: No edema.   Lymphadenopathy:      Cervical: No cervical adenopathy.   Skin:     General: Skin is warm and dry.      Coloration: Skin is not jaundiced.      Findings: No bruising or erythema.   Neurological:      General: No focal deficit present.      Mental Status: She is alert and oriented to person, place, and time. Mental status is at baseline.      Cranial Nerves: No cranial nerve deficit.      Motor: No weakness.      Coordination: Coordination normal.      Gait: Gait normal.   Psychiatric:         Mood and Affect: Mood normal.         Behavior: Behavior normal.         Thought Content: Thought content normal.         Judgment: Judgment normal.         Assessment/Plan   Problem List Items Addressed This Visit             ICD-10-CM       Cardiac and Vasculature    Hyperlipidemia E78.5     Low cholesterol and low carbohydrate diet is advised.             ENT    Upper respiratory tract infection J06.9    Relevant Medications    amoxicillin (Amoxil) 875 mg tablet       Gastrointestinal and Abdominal    Fatty infiltration of liver K76.0     Weight loss is advised. Target BMI: 25. Please follow low carbohydrate diet and exercise at least 150 minutes weekly.  Nutritional consultation is available, please let me know if interested.             Health Encounters    Healthcare maintenance - Primary Z00.00     Weight loss is advised. Target BMI: 25. Please follow low carbohydrate diet and exercise at least 150 minutes weekly.  Nutritional consultation is available, please  let me know if interested.          Relevant Orders    CBC    Comprehensive Metabolic Panel    Lipid Panel    TSH with reflex to Free T4 if abnormal       Musculoskeletal and Injuries    Pain of foot M79.673    Relevant Orders    Disability Placard    Knee pain, right M25.561     Start exercise routine, consider PT.          Relevant Orders    Disability Placard     Other Visit Diagnoses         Codes    Colon cancer screening     Z12.11    Relevant Orders    Fecal Occult Blood Immunoassay        It was a pleasure to see you today.  I would like to remind you about importance of a healthy lifestyle in order to improve your well-being and live longer.  Try to engage in physical activities for at least 150 minutes per week.  Eat about 10 servings of fruits and vegetables daily. My advice is 2 servings of fruits and 8 servings of vegetables.  For vegetables choose at least half of them green and at least half of them fresh.  Please avoid sugar, salt, fried food and saturated fat.    F/up in 1 year or sooner if needed.

## 2024-11-23 ASSESSMENT — ENCOUNTER SYMPTOMS
ARTHRALGIAS: 1
COUGH: 1

## 2024-12-27 PROCEDURE — 82274 ASSAY TEST FOR BLOOD FECAL: CPT | Performed by: INTERNAL MEDICINE

## 2025-01-03 LAB — HEMOCCULT STL QL IA: NEGATIVE

## 2025-01-08 ENCOUNTER — APPOINTMENT (OUTPATIENT)
Dept: URGENT CARE | Age: 60
End: 2025-01-08
Payer: COMMERCIAL

## 2025-01-17 ENCOUNTER — LAB (OUTPATIENT)
Dept: LAB | Facility: LAB | Age: 60
End: 2025-01-17
Payer: COMMERCIAL

## 2025-01-17 DIAGNOSIS — Z00.00 HEALTHCARE MAINTENANCE: ICD-10-CM

## 2025-01-17 LAB
ALBUMIN SERPL BCP-MCNC: 4.4 G/DL (ref 3.4–5)
ALP SERPL-CCNC: 81 U/L (ref 33–110)
ALT SERPL W P-5'-P-CCNC: 60 U/L (ref 7–45)
ANION GAP SERPL CALC-SCNC: 12 MMOL/L (ref 10–20)
AST SERPL W P-5'-P-CCNC: 49 U/L (ref 9–39)
BILIRUB SERPL-MCNC: 0.7 MG/DL (ref 0–1.2)
BUN SERPL-MCNC: 13 MG/DL (ref 6–23)
CALCIUM SERPL-MCNC: 9.5 MG/DL (ref 8.6–10.6)
CHLORIDE SERPL-SCNC: 103 MMOL/L (ref 98–107)
CHOLEST SERPL-MCNC: 196 MG/DL (ref 0–199)
CHOLESTEROL/HDL RATIO: 4.2
CO2 SERPL-SCNC: 29 MMOL/L (ref 21–32)
CREAT SERPL-MCNC: 0.79 MG/DL (ref 0.5–1.05)
EGFRCR SERPLBLD CKD-EPI 2021: 87 ML/MIN/1.73M*2
ERYTHROCYTE [DISTWIDTH] IN BLOOD BY AUTOMATED COUNT: 12.6 % (ref 11.5–14.5)
GLUCOSE SERPL-MCNC: 97 MG/DL (ref 74–99)
HCT VFR BLD AUTO: 43 % (ref 36–46)
HDLC SERPL-MCNC: 46.2 MG/DL
HGB BLD-MCNC: 14 G/DL (ref 12–16)
LDLC SERPL CALC-MCNC: 120 MG/DL
MCH RBC QN AUTO: 31.7 PG (ref 26–34)
MCHC RBC AUTO-ENTMCNC: 32.6 G/DL (ref 32–36)
MCV RBC AUTO: 97 FL (ref 80–100)
NON HDL CHOLESTEROL: 150 MG/DL (ref 0–149)
NRBC BLD-RTO: 0 /100 WBCS (ref 0–0)
PLATELET # BLD AUTO: 231 X10*3/UL (ref 150–450)
POTASSIUM SERPL-SCNC: 4.1 MMOL/L (ref 3.5–5.3)
PROT SERPL-MCNC: 6.9 G/DL (ref 6.4–8.2)
RBC # BLD AUTO: 4.42 X10*6/UL (ref 4–5.2)
SODIUM SERPL-SCNC: 140 MMOL/L (ref 136–145)
TRIGL SERPL-MCNC: 151 MG/DL (ref 0–149)
TSH SERPL-ACNC: 0.95 MIU/L (ref 0.44–3.98)
VLDL: 30 MG/DL (ref 0–40)
WBC # BLD AUTO: 5 X10*3/UL (ref 4.4–11.3)

## 2025-01-17 PROCEDURE — 80053 COMPREHEN METABOLIC PANEL: CPT

## 2025-01-17 PROCEDURE — 84443 ASSAY THYROID STIM HORMONE: CPT

## 2025-01-17 PROCEDURE — 85027 COMPLETE CBC AUTOMATED: CPT

## 2025-01-17 PROCEDURE — 80061 LIPID PANEL: CPT

## 2025-01-19 NOTE — RESULT ENCOUNTER NOTE
Your recent lab work was acceptable except mildly elevated liver enzymes.  Please schedule appt with hepatologist for further evaluation.  We will discuss details during your next office visit.   Dr. Johnson

## 2025-02-25 ENCOUNTER — TELEMEDICINE (OUTPATIENT)
Dept: PRIMARY CARE | Facility: CLINIC | Age: 60
End: 2025-02-25
Payer: COMMERCIAL

## 2025-02-25 DIAGNOSIS — U07.1 RESPIRATORY TRACT INFECTION DUE TO COVID-19 VIRUS: Primary | ICD-10-CM

## 2025-02-25 DIAGNOSIS — J98.8 RESPIRATORY TRACT INFECTION DUE TO COVID-19 VIRUS: Primary | ICD-10-CM

## 2025-02-25 PROCEDURE — 1036F TOBACCO NON-USER: CPT | Performed by: INTERNAL MEDICINE

## 2025-02-25 PROCEDURE — 99213 OFFICE O/P EST LOW 20 MIN: CPT | Performed by: INTERNAL MEDICINE

## 2025-02-25 RX ORDER — GUAIFENESIN, PSEUDOEPHEDRINE HYDROCHLORIDE 600; 60 MG/1; MG/1
TABLET, EXTENDED RELEASE ORAL
Qty: 40 TABLET | Refills: 3 | Status: SHIPPED | OUTPATIENT
Start: 2025-02-25

## 2025-02-25 RX ORDER — FLUTICASONE PROPIONATE 50 MCG
SPRAY, SUSPENSION (ML) NASAL
Qty: 16 G | Refills: 3 | Status: SHIPPED | OUTPATIENT
Start: 2025-02-25

## 2025-02-25 NOTE — PROGRESS NOTES
Subjective   Patient ID: Maureen Landis is a 60 y.o. female who presents for URI (Covid positive ).    Virtual or Telephone Consent    An interactive audio and video telecommunication system which permits real time communications between the patient (at the originating site) and provider (at the distant site) was utilized to provide this telehealth service.   Verbal consent was requested and obtained from Maureen Landis on this date, 02/25/25 for a telehealth visit.     Spoke w/ pt. Works in a school, w/ congestion for several wks  More severe this weekend, ear pressure, loose Bms, and fatigue  Went to  yesterday  Informed this am positive coivd  1st known episode of covid  Loose bowel months for several days      URI          Review of Systems    Objective   There were no vitals taken for this visit.    Physical Exam  Vitals reviewed.   Constitutional:       Appearance: Normal appearance.      Comments: Well-appearing adult in no distress, alert, appearance at baseline  Virtual exam showed patient to be alert active and otherwise appropriate  Spoke normally, with speech at baseline, without evidence of respiratory distress  Facial exam, unremarkable with no obvious eye findings  Skin exam without any obvious rash or notable findings  Mental status exam-appropriate without any worrisome findings, with normal mood and thought content     Neurological:      Mental Status: She is alert.       Assessment/Plan   Problem List Items Addressed This Visit    None  Visit Diagnoses         Codes    Respiratory tract infection due to COVID-19 virus    -  Primary U07.1, J98.8    Relevant Medications    fluticasone (Flonase) 50 mcg/actuation nasal spray    nirmatrelvir-ritonavir (Paxlovid) 300 mg (150 mg x 2)-100 mg tablet therapy pack    pseudoephedrine-guaifenesin (Mucinex D)  mg 12 hr tablet             COVID respiratory illness-reviewed measures to help with supportive care.    Encouraged patient to write out the  following instructions to optimize compliance.    Specifically encouraged ample fluids to restore hydration and to prevent further dehydration.    Tylenol extra strength, or advil 200mg - 2 pills 3 times daily for 3 to 4 days, then as needed encouraged.    Flonase nasal spray, 2 sprays twice daily for 2 to 3 weeks till postnasal drip congestion and cough clear.    Mucinex D 60/600 - 2 in am and 1 after noon    Work note sent-off work from today, return to work Monday, March 3    Instructions provided/written out regarding loose bowel months-specifically encouraged brat diet for 48 to 72 hours ample fluids avoiding fats and follow-up if not improved      Antiviral ordered per protocol.      If symptoms worsen or other symptoms such as difficulty breathing chest pain shortness of breath or overall just feeling more uncomfortable, immediate emergency room evaluation recommended.    Regarding quarantining-patient will remain quarantined at least 5 days from the onset of symptoms and if symptoms resolve and another COVID test is negative, they can leave quarantine with a mask until 10 days from the onset of the symptoms    Regarding COVID booster-patient should wait at least 8 weeks from the onset of their COVID symptoms until considering a COVID booster.    Finally, encouraged patient to call with any concerns or questions, and to call if any additional symptoms develop.

## 2025-03-31 ENCOUNTER — TELEPHONE (OUTPATIENT)
Dept: PRIMARY CARE | Facility: CLINIC | Age: 60
End: 2025-03-31
Payer: COMMERCIAL

## 2025-03-31 NOTE — TELEPHONE ENCOUNTER
----- Message from Haylee EASLEY sent at 3/28/2025  6:01 PM EDT -----  Regarding: Patient Request an Appointment with Dr Carey Ventura  Message    Request submitted on 3/28/2025 at 4:39:01 AM  Form Title: Request an Appointment  Submitted Data  ----------------------------------------    Appointment Information  Epic Provider Name: Carey Ventura  Specialty: Internal Medicine  Have you seen this provider in the last 3 years? Yes  Reason for Appointment: Congestion, cough, fever, sore throat, ear ache & clicking sound in ear     Preferred Time of Day: Afternoon     Preferred Day: Friday     Preferred Location: [facility] Zip: [phone]     Patient Information  First Name: Maureen  Middle:  Last Name: Boby  Birth Date: Sat 1965  Gender: Female  Patient Address: [address] [place] [address]   Patient City: [place]  Patient State: [place]  Patient Zip: [address]     Contact Information  First Name: Marueen  Last Name: Boby  Phone Number: [phone]  Email: [email or web address]     Insurance Information  Insurance Carrier: Medicaid  Insurance Plan: [employer]  Subscriber: Myself  Patient MemberId: [id number]  Patient SSN: [id number]  Patient GroupNumber: [id number]  Subscriber First Name:  Subscriber Last Name:  subscriberID:  Relationship To Subscriber:

## 2025-04-01 ENCOUNTER — TELEPHONE (OUTPATIENT)
Dept: PRIMARY CARE | Facility: CLINIC | Age: 60
End: 2025-04-01
Payer: COMMERCIAL

## 2025-04-01 NOTE — TELEPHONE ENCOUNTER
----- Message from Carey Ventura sent at 3/31/2025  3:59 PM EDT -----  Regarding: FW: Patient Request an Appointment with Dr Carey Ventura  Please schedule with me or any available doctors in our office, or advise Urgent Care.  emg  ----- Message -----  From: Arlette Resendiz MA  Sent: 3/31/2025   8:10 AM EDT  To: Carey Ventura MD PhD; Royer Hutton MA  Subject: FW: Patient Request an Appointment with Dr WU#      ----- Message -----  From: Haylee Ramirez  Sent: 3/28/2025   6:02 PM EDT  To:  [id number] [place] [id number] Clinical Support Staff; #  Subject: Patient Request an Appointment with Dr Carey KEYS#    Message    Request submitted on 3/28/2025 at 4:39:01 AM  Form Title: Request an Appointment  Submitted Data  ----------------------------------------    Appointment Information  Epic Provider Name: Carey Ventura  Specialty: Internal Medicine  Have you seen this provider in the last 3 years? Yes  Reason for Appointment: Congestion, cough, fever, sore throat, ear ache & clicking sound in ear     Preferred Time of Day: Afternoon     Preferred Day: Friday     Preferred Location: [facility] Zip: [address]     Patient Information  First Name: Maureen  Middle:  Last Name: Boby  Birth Date: Sat 1965  Gender: Female  Patient Address: [address] [place] [address]   Patient City: [place]  Patient State: [place]  Patient Zip: [address]     Contact Information  First Name: Maureen  Last Name: Boby  Phone Number: [phone]  Email: [email or web address]     Insurance Information  Insurance Carrier: Medicaid  Insurance Plan: [employer]  Subscriber: Myself  Patient MemberId: [id number]  Patient SSN: [id number]  Patient GroupNumber: [id number]  Subscriber First Name:  Subscriber Last Name:  subscriberID:  Relationship To Subscriber:

## 2025-06-16 ENCOUNTER — APPOINTMENT (OUTPATIENT)
Dept: OTOLARYNGOLOGY | Facility: CLINIC | Age: 60
End: 2025-06-16
Payer: COMMERCIAL

## 2025-06-16 ENCOUNTER — APPOINTMENT (OUTPATIENT)
Dept: AUDIOLOGY | Facility: CLINIC | Age: 60
End: 2025-06-16
Payer: COMMERCIAL

## 2025-08-19 DIAGNOSIS — Z12.31 ENCOUNTER FOR SCREENING MAMMOGRAM FOR BREAST CANCER: ICD-10-CM

## 2025-12-22 ENCOUNTER — APPOINTMENT (OUTPATIENT)
Dept: PRIMARY CARE | Facility: CLINIC | Age: 60
End: 2025-12-22
Payer: COMMERCIAL